# Patient Record
Sex: FEMALE | Race: WHITE | Employment: FULL TIME | URBAN - NONMETROPOLITAN AREA
[De-identification: names, ages, dates, MRNs, and addresses within clinical notes are randomized per-mention and may not be internally consistent; named-entity substitution may affect disease eponyms.]

---

## 2021-08-04 ENCOUNTER — OFFICE VISIT (OUTPATIENT)
Dept: OBGYN CLINIC | Age: 51
End: 2021-08-04
Payer: COMMERCIAL

## 2021-08-04 ENCOUNTER — HOSPITAL ENCOUNTER (OUTPATIENT)
Age: 51
Setting detail: SPECIMEN
Discharge: HOME | End: 2021-08-04
Payer: COMMERCIAL

## 2021-08-04 VITALS
SYSTOLIC BLOOD PRESSURE: 113 MMHG | WEIGHT: 228 LBS | DIASTOLIC BLOOD PRESSURE: 79 MMHG | HEART RATE: 77 BPM | BODY MASS INDEX: 36.64 KG/M2 | HEIGHT: 66 IN

## 2021-08-04 DIAGNOSIS — Z13.820 SCREENING FOR OSTEOPOROSIS: ICD-10-CM

## 2021-08-04 DIAGNOSIS — Z01.419 WOMEN'S ANNUAL ROUTINE GYNECOLOGICAL EXAMINATION: Primary | ICD-10-CM

## 2021-08-04 PROCEDURE — 99396 PREV VISIT EST AGE 40-64: CPT | Performed by: OBSTETRICS & GYNECOLOGY

## 2021-08-04 RX ORDER — MONTELUKAST SODIUM 10 MG/1
TABLET ORAL
COMMUNITY

## 2021-08-04 RX ORDER — LEVOTHYROXINE SODIUM 112 MCG
TABLET ORAL
COMMUNITY
Start: 2021-05-21

## 2021-08-04 ASSESSMENT — ENCOUNTER SYMPTOMS
DIARRHEA: 0
SHORTNESS OF BREATH: 0
ABDOMINAL PAIN: 0
CONSTIPATION: 0

## 2021-08-04 NOTE — PROGRESS NOTES
DATE OF VISIT:  21  PATIENT NAME:  Dalila Ludwig     YOB: 1970    48 y.o. Chief Complaint   Patient presents with    Annual Exam     Needs pap. Last mamm 10-. Pt. got a DVT in her right arm last summer. it was believed to be a result of IV in right hand. pt. stopped her ocp at that time. Pt. would like to get colonoscopy. Has never had bone density. Patient's last menstrual period was 2021 (approximate). Primary Care Physician: Mallory Penaloza    The patient was seen and examined. She has no chiefcomplaint today and is here for her annual exam.  Her bowels are regular. There are no voiding complaints. She denies any bloating. She denies vaginal discharge and was counseled on STD's and the need for barriercontraception. HPI : Dalila Ludwig is a 48 y.o. female  who presents today for her annual.    ______________________________________________________________________    OB History    Para Term  AB Living   0 0 0 0 0 0   SAB TAB Ectopic Molar Multiple Live Births   0 0 0 0 0 0     Past Medical History:   Diagnosis Date    History of DVT (deep vein thrombosis)     right arm-believed to  be resulted from a IV she received in right hand.     Hypothyroid     Tinnitus     left ear                                                                   Past Surgical History:   Procedure Laterality Date    CHOLECYSTECTOMY       Family History   Problem Relation Age of Onset    Lung Cancer Paternal Grandfather     Heart Disease Maternal Grandmother     Heart Disease Maternal Grandfather     High Cholesterol Mother     High Cholesterol Sister      Social History     Socioeconomic History    Marital status:      Spouse name: Not on file    Number of children: Not on file    Years of education: Not on file    Highest education level: Not on file   Occupational History    Not on file   Tobacco Use    Smoking status: Never Smoker    Smokeless tobacco: Never Used   Substance and Sexual Activity    Alcohol use: Yes     Comment: occasionally    Drug use: Never    Sexual activity: Not on file   Other Topics Concern    Not on file   Social History Narrative    Not on file     Social Determinants of Health     Financial Resource Strain:     Difficulty of Paying Living Expenses:    Food Insecurity:     Worried About Running Out of Food in the Last Year:     [address] in the Last Year:    Transportation Needs:     Lack of Transportation (Medical):  Lack of Transportation (Non-Medical):    Physical Activity:     Days of Exercise per Week:     Minutes of Exercise per Session:    Stress:     Feeling of Stress :    Social Connections:     Frequency of Communication with Friends and Family:     Frequency of Social Gatherings with Friends and Family:     Attends [employer] Services:     Active Member of Clubs or Organizations:     Attends Club or Organization Meetings:     Marital Status:    Intimate Partner Violence:     Fear of Current or Ex-Partner:     Emotionally Abused:     Physically Abused:     Sexually Abused:      Vitals:    08/04/21 1031   BP: 113/79   Site: Right Upper Arm   Position: Sitting   Pulse: 77   Weight: 228 lb (103.4 kg)   Height: 5' 6\" (1.676 m)     Body mass index is 36.8 kg/m². Patient's last menstrual period was 07/16/2021 (approximate). MEDICATIONS:  Current Outpatient Medications   Medication Sig Dispense Refill    SYNTHROID 112 MCG tablet       montelukast (SINGULAIR) 10 MG tablet Take by mouth       No current facility-administered medications for this visit.        ALLERGIES:  Allergies as of 08/04/2021 - Fully Reviewed 08/04/2021   Allergen Reaction Noted    Gadolinium derivatives  08/04/2021           Symptoms of decreased mood absent    **If either question is answered in a  positive fashion then completethe PHQ9 Scoring Evaluation and make the appropriate referral**      Gynecologic History:     Patient's last menstrual period was 07/16/2021 (approximate). Sexually Active: Yes    STD History: No     Permanent Sterilization:No   Reversible Birth Control: No        Hormone Replacement Exposure: No      Genetic Qualified Family History of Breast, Ovarian , Colon or Uterine Cancer:No   If YES see scanned worksheet. Preventative Health Testing:  Colposcopy History:   Date of Last Mammogram: 2020  Date of Last Colonoscopy:   Date of Last Bone Density:      ______________________________________________________________________    REVIEW OF SYSTEMS:       Review of Systems   Constitutional: Negative for chills, fatigue and fever. Respiratory: Negative for shortness of breath. Cardiovascular: Negative for chest pain. Gastrointestinal: Negative for abdominal pain, constipation and diarrhea. Genitourinary: Negative for dysuria, enuresis, frequency, menstrual problem, pelvic pain, urgency and vaginal bleeding. Neurological: Negative for dizziness, light-headedness and headaches. PHYSICAL EXAM:    Physical Exam  Constitutional:       Appearance: Normal appearance. Genitourinary:      Pelvic exam was performed with patient in the lithotomy position. Vulva, vagina, cervix, uterus, right adnexa and left adnexa normal.   HENT:      Head: Atraumatic. Mouth/Throat:      Mouth: Mucous membranes are moist.   Eyes:      Extraocular Movements: Extraocular movements intact. Pupils: Pupils are equal, round, and reactive to light. Cardiovascular:      Rate and Rhythm: Normal rate. Pulmonary:      Effort: Pulmonary effort is normal.   Chest:      Breasts:         Right: Normal.         Left: Normal.   Abdominal:      General: There is no distension. Palpations: Abdomen is soft. Tenderness: There is no abdominal tenderness. Musculoskeletal:         General: Normal range of motion. Cervical back: Normal range of motion.    Neurological: Mental Status: She is alert and oriented to person, place, and time. Skin:     General: Skin is warm and dry. Psychiatric:         Mood and Affect: Mood normal.         Behavior: Behavior normal.         Thought Content: Thought content normal.         Judgment: Judgment normal.   Chaperone present: chaperone declined. POC Cultures:  No results found for this visit on 08/04/21. ASSESSMENT:      48 y.o. Annual  1. Women's annual routine gynecological examination    2. Screening for osteoporosis          There are no problems to display for this patient. Hereditary Breast, Ovarian, Colon and Uterine Cancer screening Done. Tobacco & Secondary smoke risks reviewed; instructed on cessation and avoidance    PLAN:    Return in about 1 year (around 8/4/2022) for annual.  Orders Placed This Encounter   Procedures    DEXA Bone Density 2 Sites    PAP SMEAR       Repeat Annual every 1 year  Cervical Cytology Evaluation begins at 24years old. If Negative Cytology, Follow-up screening per current guidelines. Mammograms every 1year. If 37 yo and last mammogram was negative. Calcium and Vitamin D dosing reviewed. Colonoscopy screening reviewed as well as onset for bone density testing. Birth control and barrier recommendationsdiscussed. STD counseling and prevention reviewed. Gardisil counseling completed for all patients 7-35 yo. Routine healthmaintenance per patients PCP.     Electronicallysigned by:  Ruby Rizo DO on 08/04/21

## 2021-08-07 LAB
HPV SAMPLE: NORMAL
HPV, GENOTYPE 16: NOT DETECTED
HPV, GENOTYPE 18: NOT DETECTED
HPV, HIGH RISK OTHER: NOT DETECTED
HPV, INTERPRETATION: NORMAL
SPECIMEN DESCRIPTION: NORMAL

## 2021-08-18 LAB — CYTOLOGY REPORT: NORMAL

## 2022-06-13 ENCOUNTER — DASHBOARD ENCOUNTERS (OUTPATIENT)
Age: 52
End: 2022-06-13

## 2022-06-13 ENCOUNTER — CLAIMS CREATED FROM THE CLAIM WINDOW (OUTPATIENT)
Dept: URBAN - METROPOLITAN AREA CLINIC 29 | Facility: CLINIC | Age: 52
End: 2022-06-13
Payer: COMMERCIAL

## 2022-06-13 ENCOUNTER — WEB ENCOUNTER (OUTPATIENT)
Dept: URBAN - METROPOLITAN AREA CLINIC 29 | Facility: CLINIC | Age: 52
End: 2022-06-13

## 2022-06-13 VITALS
TEMPERATURE: 97.3 F | DIASTOLIC BLOOD PRESSURE: 81 MMHG | SYSTOLIC BLOOD PRESSURE: 128 MMHG | WEIGHT: 128 LBS | BODY MASS INDEX: 20.57 KG/M2 | HEART RATE: 63 BPM | HEIGHT: 66 IN

## 2022-06-13 DIAGNOSIS — Z12.11 SCREEN FOR COLON CANCER: ICD-10-CM

## 2022-06-13 DIAGNOSIS — K59.04 CHRONIC IDIOPATHIC CONSTIPATION: ICD-10-CM

## 2022-06-13 PROBLEM — 82934008 CHRONIC IDIOPATHIC CONSTIPATION: Status: ACTIVE | Noted: 2022-06-13

## 2022-06-13 PROCEDURE — 99204 OFFICE O/P NEW MOD 45 MIN: CPT | Performed by: INTERNAL MEDICINE

## 2022-06-13 RX ORDER — SODIUM, POTASSIUM,MAG SULFATES 17.5-3.13G
177ML SOLUTION, RECONSTITUTED, ORAL ORAL ONCE A DAY
Qty: 354 ML | Refills: 0 | OUTPATIENT
Start: 2022-06-13 | End: 2022-06-15

## 2022-06-13 NOTE — HPI-TODAY'S VISIT:
Ms. Winchester is a  51-year-old woman who presents today per request by Dr. Sam for colon cancer screening.  She has never had a colonoscopy nor does she have any family history of colon cancer or polyps.  She does not currently have any GI problems. She has intermitted stressed induced constipation.

## 2022-06-20 ENCOUNTER — LAB OUTSIDE AN ENCOUNTER (OUTPATIENT)
Dept: URBAN - METROPOLITAN AREA CLINIC 29 | Facility: CLINIC | Age: 52
End: 2022-06-20

## 2022-07-26 ENCOUNTER — CLAIMS CREATED FROM THE CLAIM WINDOW (OUTPATIENT)
Dept: URBAN - METROPOLITAN AREA SURGERY CENTER 7 | Facility: SURGERY CENTER | Age: 52
End: 2022-07-26
Payer: COMMERCIAL

## 2022-07-26 ENCOUNTER — WEB ENCOUNTER (OUTPATIENT)
Dept: URBAN - METROPOLITAN AREA SURGERY CENTER 7 | Facility: SURGERY CENTER | Age: 52
End: 2022-07-26

## 2022-07-26 DIAGNOSIS — Z12.11 COLON CANCER SCREENING: ICD-10-CM

## 2022-07-26 PROCEDURE — 45378 DIAGNOSTIC COLONOSCOPY: CPT | Performed by: INTERNAL MEDICINE

## 2022-07-26 PROCEDURE — G8907 PT DOC NO EVENTS ON DISCHARG: HCPCS | Performed by: INTERNAL MEDICINE

## 2024-05-28 ENCOUNTER — HOSPITAL ENCOUNTER (OUTPATIENT)
Age: 54
Setting detail: SPECIMEN
Discharge: HOME | End: 2024-05-28

## 2024-05-28 ENCOUNTER — OFFICE VISIT (OUTPATIENT)
Dept: OBGYN CLINIC | Age: 54
End: 2024-05-28
Payer: COMMERCIAL

## 2024-05-28 VITALS — BODY MASS INDEX: 37.83 KG/M2 | SYSTOLIC BLOOD PRESSURE: 124 MMHG | WEIGHT: 234.4 LBS | DIASTOLIC BLOOD PRESSURE: 82 MMHG

## 2024-05-28 DIAGNOSIS — N76.0 ACUTE VAGINITIS: Primary | ICD-10-CM

## 2024-05-28 DIAGNOSIS — N76.0 ACUTE VAGINITIS: ICD-10-CM

## 2024-05-28 PROCEDURE — 99213 OFFICE O/P EST LOW 20 MIN: CPT

## 2024-05-28 NOTE — PROGRESS NOTES
DATE OF VISIT:  5/31/24    PATIENT NAME:  Bebe Marvin     YOB: 1970    REASON FOR VISIT:    Chief Complaint   Patient presents with    Vaginal Pain     Has vaginal pain which comes and goes. She states she has noticed it for the past several weeks. [place] does cause it to increase. She thinks she may feel a small lump in the vaginal area.       Chaperone for Intimate Exam  Chaperone was offered and accepted as part of the rooming process.  Chaperone: Karlene [employer] student     HISTORY OF PRESENT ILLNESS:  Patient presents with vaginal pain for the past several weeks.  It comes and goes but is worsened by intercourse.  She has noticed some discharge, usually brown in color.  Agreeable to vaginal cx today.  She also reports a lump that she has noticed in vagina since symptoms began.  She states that Dr. Motley felt a fistula when she has been seen previously so she is wondering if that is flared up.  Denies any issues with emptying bladder, having bowel movements.  Discussed importance of returning for annual visit, especially if cx is negative to rule out other causes of pain.        Patient's last menstrual period was 04/26/2024 (exact date).  Vitals:    05/28/24 1535   BP: 124/82   Weight: 106.3 kg (234 lb 6.4 oz)     Body mass index is 37.83 kg/m².  Allergies   Allergen Reactions    Gadolinium Derivatives      Patient got this medication in an IV in hand and then got a DVT in arm     Current Outpatient Medications   Medication Sig Dispense Refill    Fexofenadine HCl (ALLEGRA PO) Take by mouth      SYNTHROID 112 MCG tablet       metroNIDAZOLE (FLAGYL) 500 MG tablet Take 1 tablet by mouth 2 times daily for 7 days 14 tablet 0    montelukast (SINGULAIR) 10 MG tablet Take by mouth (Patient not taking: Reported on 5/28/2024)       No current facility-administered medications for this visit.     Social History     Socioeconomic History    Marital status:    Tobacco Use    Smoking status: Never

## 2024-05-29 LAB
CANDIDA SPECIES: NEGATIVE
GARDNERELLA VAGINALIS: POSITIVE
SOURCE: ABNORMAL
TRICHOMONAS: NEGATIVE

## 2024-05-29 RX ORDER — METRONIDAZOLE 500 MG/1
500 TABLET ORAL 2 TIMES DAILY
Qty: 14 TABLET | Refills: 0 | Status: SHIPPED | OUTPATIENT
Start: 2024-05-29 | End: 2024-06-05

## 2024-07-03 ENCOUNTER — HOSPITAL ENCOUNTER (OUTPATIENT)
Age: 54
Setting detail: SPECIMEN
Discharge: HOME | End: 2024-07-03

## 2024-07-03 ENCOUNTER — OFFICE VISIT (OUTPATIENT)
Dept: OBGYN CLINIC | Age: 54
End: 2024-07-03
Payer: COMMERCIAL

## 2024-07-03 VITALS
BODY MASS INDEX: 36.16 KG/M2 | DIASTOLIC BLOOD PRESSURE: 82 MMHG | SYSTOLIC BLOOD PRESSURE: 118 MMHG | HEIGHT: 66 IN | WEIGHT: 225 LBS

## 2024-07-03 DIAGNOSIS — N76.0 ACUTE VAGINITIS: ICD-10-CM

## 2024-07-03 DIAGNOSIS — R10.2 VAGINAL PAIN: ICD-10-CM

## 2024-07-03 DIAGNOSIS — N76.0 ACUTE VAGINITIS: Primary | ICD-10-CM

## 2024-07-03 LAB
CANDIDA SPECIES: NEGATIVE
GARDNERELLA VAGINALIS: NEGATIVE
SOURCE: NORMAL
TRICHOMONAS: NEGATIVE

## 2024-07-03 PROCEDURE — 99213 OFFICE O/P EST LOW 20 MIN: CPT | Performed by: OBSTETRICS & GYNECOLOGY

## 2024-07-03 SDOH — ECONOMIC STABILITY: FOOD INSECURITY: WITHIN THE PAST 12 MONTHS, YOU WORRIED THAT YOUR FOOD WOULD RUN OUT BEFORE YOU GOT MONEY TO BUY MORE.: NEVER TRUE

## 2024-07-03 SDOH — ECONOMIC STABILITY: FOOD INSECURITY: WITHIN THE PAST 12 MONTHS, THE FOOD YOU BOUGHT JUST DIDN'T LAST AND YOU DIDN'T HAVE MONEY TO GET MORE.: NEVER TRUE

## 2024-07-03 SDOH — ECONOMIC STABILITY: TRANSPORTATION INSECURITY
IN THE PAST 12 MONTHS, HAS LACK OF TRANSPORTATION KEPT YOU FROM MEETINGS, WORK, OR FROM GETTING THINGS NEEDED FOR DAILY LIVING?: NO

## 2024-07-03 SDOH — ECONOMIC STABILITY: HOUSING INSECURITY
IN THE LAST 12 MONTHS, WAS THERE A TIME WHEN YOU DID NOT HAVE A STEADY PLACE TO SLEEP OR SLEPT IN A SHELTER (INCLUDING NOW)?: NO

## 2024-07-03 SDOH — ECONOMIC STABILITY: INCOME INSECURITY: HOW HARD IS IT FOR YOU TO PAY FOR THE VERY BASICS LIKE FOOD, HOUSING, MEDICAL CARE, AND HEATING?: NOT HARD AT ALL

## 2024-07-03 ASSESSMENT — PATIENT HEALTH QUESTIONNAIRE - PHQ9
SUM OF ALL RESPONSES TO PHQ QUESTIONS 1-9: 0
1. LITTLE INTEREST OR PLEASURE IN DOING THINGS: NOT AT ALL
2. FEELING DOWN, DEPRESSED OR HOPELESS: NOT AT ALL
SUM OF ALL RESPONSES TO PHQ9 QUESTIONS 1 & 2: 0
SUM OF ALL RESPONSES TO PHQ QUESTIONS 1-9: 0
SUM OF ALL RESPONSES TO PHQ9 QUESTIONS 1 & 2: 0
2. FEELING DOWN, DEPRESSED OR HOPELESS: NOT AT ALL
1. LITTLE INTEREST OR PLEASURE IN DOING THINGS: NOT AT ALL

## 2024-07-03 NOTE — PROGRESS NOTES
DATE OF VISIT:  7/3/24    PATIENT NAME:  Bebe Marvin     YOB: 1970    REASON FOR VISIT:    Chief Complaint   Patient presents with    Vaginal Pain     Patient c/o aching/throbbing vaginal pain. This has been going on for week or months.  In May she was treated for BV.  She did have a brown discharge that has stopped over the past 2-3 days.  Symptoms improved with treatment of BV but are not gone.          HISTORY OF PRESENT ILLNESS:  Pt continues to have painful vagina; distracted by pain throughout the day-aching, throbbing sensation; the thought of intercourse is overwhelming bc of the pain; felt better initially after bv treatment but pain is back; discharge that she noticed before was brown and she has a remote hx of a poss vaginal fistula; hx of provoked dvt so vaginal estrogen should be tolerated        Patient's last menstrual period was 04/25/2024 (approximate).  Vitals:    07/03/24 1053   BP: 118/82   Site: Right Upper Arm   Position: Sitting   Weight: 102.1 kg (225 lb)   Height: 1.664 m (5' 5.5\")     Body mass index is 36.87 kg/m².  Allergies   Allergen Reactions    Gadolinium Derivatives      (Contrast dye) Patient got this medication in an IV in hand and then got a DVT in arm     Current Outpatient Medications   Medication Sig Dispense Refill    Ospemifene 60 MG TABS Take 1 tablet by mouth daily 30 tablet 11    Fexofenadine HCl (ALLEGRA PO) Take by mouth      SYNTHROID 112 MCG tablet        No current facility-administered medications for this visit.     Social History     Socioeconomic History    Marital status:      Spouse name: None    Number of children: None    Years of education: None    Highest education level: None   Tobacco Use    Smoking status: Never    Smokeless tobacco: Never   Substance and Sexual Activity    Alcohol use: Yes     Comment: occasionally    Drug use: Never    Sexual activity: Not Currently     Partners: Male     Comment: discomfort       REVIEW OF

## 2024-07-31 ASSESSMENT — ENCOUNTER SYMPTOMS: SHORTNESS OF BREATH: 0

## 2024-10-09 ENCOUNTER — OFFICE VISIT (OUTPATIENT)
Dept: OBGYN CLINIC | Age: 54
End: 2024-10-09
Payer: COMMERCIAL

## 2024-10-09 ENCOUNTER — HOSPITAL ENCOUNTER (OUTPATIENT)
Age: 54
Setting detail: SPECIMEN
Discharge: HOME | End: 2024-10-09

## 2024-10-09 VITALS
BODY MASS INDEX: 36.96 KG/M2 | WEIGHT: 230 LBS | HEIGHT: 66 IN | DIASTOLIC BLOOD PRESSURE: 80 MMHG | SYSTOLIC BLOOD PRESSURE: 132 MMHG

## 2024-10-09 DIAGNOSIS — Z01.419 WOMEN'S ANNUAL ROUTINE GYNECOLOGICAL EXAMINATION: Primary | ICD-10-CM

## 2024-10-09 DIAGNOSIS — Z12.31 VISIT FOR SCREENING MAMMOGRAM: ICD-10-CM

## 2024-10-09 PROCEDURE — 99396 PREV VISIT EST AGE 40-64: CPT | Performed by: OBSTETRICS & GYNECOLOGY

## 2024-10-09 RX ORDER — ERGOCALCIFEROL 1.25 MG/1
CAPSULE, LIQUID FILLED ORAL
COMMUNITY
Start: 2024-10-07

## 2024-10-09 RX ORDER — EPINEPHRINE 0.3 MG/.3ML
INJECTION, SOLUTION INTRAMUSCULAR
COMMUNITY
Start: 2024-07-17

## 2024-10-09 RX ORDER — LEVOTHYROXINE SODIUM 125 MCG
TABLET ORAL
COMMUNITY
Start: 2024-09-26

## 2024-10-09 ASSESSMENT — ENCOUNTER SYMPTOMS
SHORTNESS OF BREATH: 0
DIARRHEA: 0
ABDOMINAL PAIN: 0
CONSTIPATION: 0

## 2024-10-11 LAB
HPV I/H RISK 4 DNA CVX QL NAA+PROBE: NOT DETECTED
HPV SAMPLE: NORMAL
HPV, INTERPRETATION: NORMAL
HPV16 DNA CVX QL NAA+PROBE: NOT DETECTED
HPV18 DNA CVX QL NAA+PROBE: NOT DETECTED
SPECIMEN DESCRIPTION: NORMAL

## 2024-10-14 LAB — CYTOLOGY REPORT: NORMAL
